# Patient Record
Sex: MALE | Race: WHITE | URBAN - METROPOLITAN AREA
[De-identification: names, ages, dates, MRNs, and addresses within clinical notes are randomized per-mention and may not be internally consistent; named-entity substitution may affect disease eponyms.]

---

## 2017-09-01 ENCOUNTER — OFFICE VISIT (OUTPATIENT)
Dept: FAMILY MEDICINE | Facility: CLINIC | Age: 37
End: 2017-09-01
Payer: COMMERCIAL

## 2017-09-01 VITALS
SYSTOLIC BLOOD PRESSURE: 127 MMHG | TEMPERATURE: 98.1 F | HEART RATE: 84 BPM | BODY MASS INDEX: 30.68 KG/M2 | OXYGEN SATURATION: 97 % | DIASTOLIC BLOOD PRESSURE: 83 MMHG | WEIGHT: 220 LBS

## 2017-09-01 DIAGNOSIS — Z23 NEED FOR PROPHYLACTIC VACCINATION WITH TETANUS-DIPHTHERIA (TD): ICD-10-CM

## 2017-09-01 DIAGNOSIS — H16.002 CORNEAL ULCER, LEFT: Primary | ICD-10-CM

## 2017-09-01 DIAGNOSIS — Z23 NEED FOR PROPHYLACTIC VACCINATION AND INOCULATION AGAINST INFLUENZA: ICD-10-CM

## 2017-09-01 PROCEDURE — 90472 IMMUNIZATION ADMIN EACH ADD: CPT | Performed by: PHYSICIAN ASSISTANT

## 2017-09-01 PROCEDURE — 90715 TDAP VACCINE 7 YRS/> IM: CPT | Performed by: PHYSICIAN ASSISTANT

## 2017-09-01 PROCEDURE — 90686 IIV4 VACC NO PRSV 0.5 ML IM: CPT | Performed by: PHYSICIAN ASSISTANT

## 2017-09-01 PROCEDURE — 90471 IMMUNIZATION ADMIN: CPT | Performed by: PHYSICIAN ASSISTANT

## 2017-09-01 PROCEDURE — 99213 OFFICE O/P EST LOW 20 MIN: CPT | Mod: 25 | Performed by: PHYSICIAN ASSISTANT

## 2017-09-01 RX ORDER — ERYTHROMYCIN 5 MG/G
1 OINTMENT OPHTHALMIC AT BEDTIME
Qty: 1 TUBE | Refills: 0 | Status: SHIPPED | OUTPATIENT
Start: 2017-09-01

## 2017-09-01 NOTE — PROGRESS NOTES
SUBJECTIVE:   Vin Salinas is a 37 year old male who presents to clinic today for the following health issues:      Eye(s) Problem  Onset: Last night    Description:   Location: right  Pain: YES  Redness: YES    Accompanying Signs & Symptoms:  Discharge/mattering: no  Swelling: YES  Visual changes: no  Fever: no  Nasal Congestion: no  Bothered by bright lights: YES    History:   Trauma: no   Foreign body exposure: no     Precipitating factors:   Wearing contacts: no     Alleviating factors:  Improved by: none    Therapies Tried and outcome: OTC med        Problem list and histories reviewed & adjusted, as indicated.  Additional history: His wife noticed he was rubbing his eyes aggressively.    Reviewed and updated as needed this visit by clinical staffTobacco  Allergies  Meds       ROS:  Constitutional, HEENT, cardiovascular, pulmonary, gi and gu systems are negative, except as otherwise noted.      OBJECTIVE:   /83 (BP Location: Left arm, Patient Position: Chair, Cuff Size: Adult Regular)  Pulse 84  Temp 98.1  F (36.7  C) (Oral)  Wt 220 lb (99.8 kg)  SpO2 97%  BMI 30.68 kg/m2  Body mass index is 30.68 kg/(m^2).  GENERAL: healthy, alert and no distress  EYES: PERRL, EOMI, eyelids- Normal and conjunctiva/corneas- sl conjunctival injection noted L eye with abrasion noted 3mm lateral edge of cornea under fluorescein evaluation.  R eye WNL   HENT: ear canals and TM's normal, nose and mouth without ulcers or lesions  NECK: no adenopathy, no asymmetry, masses, or scars and thyroid normal to palpation    Diagnostic Test Results:  none     ASSESSMENT/PLAN:   1. Corneal abrasion left  - erythromycin (ROMYCIN) ophthalmic ointment; Place 1 Application Into the left eye At Bedtime  Dispense: 1 Tube; Refill: 0    2. Need for prophylactic vaccination and inoculation against influenza  - Vaccine Administration, Initial [57793]  - FLU VAC, SPLIT VIRUS IM > 3 YO (QUADRIVALENT) [64234]  - Vaccine Administration,  Initial [98345]    3. Need for prophylactic vaccination with tetanus-diphtheria (TD)  - TDAP VACCINE (ADACEL)    Use medication as directed.  Follow up if symptoms should persist, change or worsen.  Patient amenable to this follow up plan.     Brandi Melchor PA-C  [facility]

## 2017-09-01 NOTE — NURSING NOTE
"Chief Complaint   Patient presents with     Eye Problem     started last night on the right eye. Possible for pink eye       Initial /83 (BP Location: Left arm, Patient Position: Chair, Cuff Size: Adult Regular)  Pulse 84  Temp 98.1  F (36.7  C) (Oral)  Wt 220 lb (99.8 kg)  SpO2 97%  BMI 30.68 kg/m2 Estimated body mass index is 30.68 kg/(m^2) as calculated from the following:    Height as of 9/21/16: 5' 11\" (1.803 m).    Weight as of this encounter: 220 lb (99.8 kg).  Medication Reconciliation: complete   Patti Jones MA        "

## 2017-09-01 NOTE — PATIENT INSTRUCTIONS
This summary includes the important diagnoses, test, medications and other important parts of your medical history.  Below are a few good we sites you can use to learn more about these.     Www.[employer].org : Up to date and easily searchable information on multiple topics.  Www.[employer].org/Pharmacy/c_539084.asp : [facility] $4.99 medications  Www.medlineplus.gov : medication info, interactive tutorials, watch real surgeries online  Www.familydoctor.org : good info from the Academy of Family Physicians  Www.Burst Medi[employer]ic.com : good info from the [facility]  Www.cdc.gov : public health info, travel advisories, epidemics (H1N1)  Www.aap.org : children's health info, normal development, vaccinations  Www.health.[facility].[place].us : [place] dept of heat, public health issues in [place], N1N1    Based on your medical history and these are the current health maintenance or preventive care services that you are due for (some may have been done at this visit:)  Health Maintenance Due   Topic Date Due     TETANUS IMMUNIZATION (SYSTEM ASSIGNED)  03/18/2000     INFLUENZA VACCINE (SYSTEM ASSIGNED)  09/01/2017     =================================================================================  Normal Values   Blood pressure  <140/90 for most adults    <130/80 for some chronic diseases (ask your care team about yours)    BMI (body mass index)  18.5-25 kg/m2 (based on height and weight)     Thank you for visiting [facility]    Normal or non-critical lab and imaging results will be communicated to you by MyChart, letter or phone within 7 days.  If you do not hear from us within 10 days, please call the clinic. If you have a critical or abnormal lab result, we will notify you by phone as soon as possible.     If you have any questions regarding your visit please contact:     Team Comfort:   Clinic Hours Telephone Number   Dr. Derek Dupree   7am-5pm   Monday - Friday [phone]     Pharmacy 8:30am-9pm Monday-Friday    9am-5pm Saturday-Sunday [phone]   Urgent Care 11am-9pm Monday-Friday        9am-5pm Saturday-Sunday [phone]     After hours, weekend or if you need to make an appointment with your primary provider please call [phone].   After Hours nurse advise: call [facility]: [phone]    Medication Refills:  Call your pharmacy and they will forward the refill to us. Please allow 3 business days for your refills to be completed.    Use [employer] (secure email communication and access to your chart) to send your primary care provider a message or make an appointment. Ask someone on your Team how to sign up for [employer] To log on to [employer] or for more information in [facility] please visit the website at [email or web address]  As of October 8, 2013, all password changes, disabled accounts, or ID changes in [employer]MyHealth will be done by our Access Services Department.   If you need help with your account or password, call: [phone]. Clinic staff no longer has the ability to change passwords.

## 2017-09-01 NOTE — MR AVS SNAPSHOT
After Visit Summary   9/1/2017    Vin Salinas    MRN: [id number]           Patient Information     Date Of Birth          1980        Visit Information        Provider Department      9/1/2017 4:40 PM Brandi Melchor PA-C [facility]        Today's Diagnoses     Corneal abrasion left    -  1    Need for prophylactic vaccination and inoculation against influenza        Need for prophylactic vaccination with tetanus-diphtheria (TD)          Care Instructions    This summary includes the important diagnoses, test, medications and other important parts of your medical history.  Below are a few good we sites you can use to learn more about these.     Www.Guides.co.SnapLayout : Up to date and easily searchable information on multiple topics.  Www.Guides.co.SnapLayout/Pharmacy/c_539084.asp : [facility] $4.99 medications  Www.[employer].gov : medication info, interactive tutorials, watch real surgeries online  Www.familydoctor.org : good info from the Academy of Family Physicians  Www.WebNotes.Zurrba : good info from the [facility]  Www.cdc.gov : public health info, travel advisories, epidemics (H1N1)  Www.aap.org : children's health info, normal development, vaccinations  Www.health.[facility].[place].us : [place] dept of heatlh, public health issues in [place], N1N1    Based on your medical history and these are the current health maintenance or preventive care services that you are due for (some may have been done at this visit:)  Health Maintenance Due   Topic Date Due     TETANUS IMMUNIZATION (SYSTEM ASSIGNED)  03/18/2000     INFLUENZA VACCINE (SYSTEM ASSIGNED)  09/01/2017     =================================================================================  Normal Values   Blood pressure  <140/90 for most adults    <130/80 for some chronic diseases (ask your care team about yours)    BMI (body mass index)  18.5-25 kg/m2 (based on height and weight)     Thank you for visiting [facility]    Normal or non-critical lab and imaging results will be communicated to you by MyChart, letter or phone within 7 days.  If you do not hear from us within 10 days, please call the clinic. If you have a critical or abnormal lab result, we will notify you by phone as soon as possible.     If you have any questions regarding your visit please contact:     Team Comfort:   Clinic Hours Telephone Number   Dr. Derek Dupree   7am-5pm  Monday - Friday [phone]     Pharmacy 8:30am-9pm Monday-Friday    9am-5pm Saturday-Sunday [phone]   Urgent Care 11am-9pm Monday-Friday        9am-5pm Saturday-Sunday [phone]     After hours, weekend or if you need to make an appointment with your primary provider please call [phone].   After Hours nurse advise: call [facility]: [phone]    Medication Refills:  Call your pharmacy and they will forward the refill to us. Please allow 3 business days for your refills to be completed.    Use [employer] (secure email communication and access to your chart) to send your primary care provider a message or make an appointment. Ask someone on your Team how to sign up for [employer]. To log on to [employer] or for more information in [employer] please visit the website at [email or web address].  As of October 8, 2013, all password changes, disabled accounts, or ID changes in [employer]/MyHealth will be done by our Access Services Department.   If you need help with your account or password, call: [phone]. Clinic staff no longer has the ability to change passwords.               Follow-ups after your visit        Who to contact     If you have questions or need follow up information about today's clinic visit or your schedule please contact [facility] directly at [phone].  Normal or non-critical lab and imaging results will be communicated to you by MyChart,  "letter or phone within 4 business days after the clinic has received the results. If you do not hear from us within 7 days, please contact the clinic through [employer] or phone. If you have a critical or abnormal lab result, we will notify you by phone as soon as possible.  Submit refill requests through [employer] or call your pharmacy and they will forward the refill request to us. Please allow 3 business days for your refill to be completed.          Additional Information About Your Visit        [employer] Information     [employer] lets you send messages to your doctor, view your test results, renew your prescriptions, schedule appointments and more. To sign up, go to [email or web address] . Click on \"Log in\" on the left side of the screen, which will take you to the Welcome page. Then click on \"Sign up Now\" on the right side of the page.     You will be asked to enter the access code listed below, as well as some personal information. Please follow the directions to create your username and password.     Your access code is: [email or web address]  Expires: 2017  8:20 AM     Your access code will  in 90 days. If you need help or a new code, please call your [facility] clinic or [phone].        Care EveryWhere ID     This is your Care EveryWhere ID. This could be used by other organizations to access your [facility] medical records  [id number]        Your Vitals Were     Pulse Temperature Pulse Oximetry BMI (Body Mass Index)          84 98.1  F (36.7  C) (Oral) 97% 30.68 kg/m2         Blood Pressure from Last 3 Encounters:   17 127/83   16 121/81   06/15/16 114/73    Weight from Last 3 Encounters:   17 220 lb (99.8 kg)   16 195 lb 6.4 oz (88.6 kg)   08/10/16 195 lb (88.5 kg)              We Performed the Following     FLU VAC, SPLIT VIRUS IM > 3 YO (QUADRIVALENT) [92233]     TDAP VACCINE (ADACEL)     Vaccine Administration, Initial [70124]     Vaccine Administration, Initial [45199]        "   Today's Medication Changes          These changes are accurate as of: 9/1/17 11:59 PM.  If you have any questions, ask your nurse or doctor.               Start taking these medicines.        Dose/Directions    erythromycin ophthalmic ointment   Commonly known as:  ROMYCIN   Used for:  Corneal ulcer, left   Started by:  Brandi Melchor PA-C        Dose:  1 Application   Place 1 Application Into the left eye At Bedtime   Quantity:  1 Tube   Refills:  0            Where to get your medicines      These medications were sent to [facility]/pharmacy #[id number] [place], [place] - [address] [address]     Phone:  [phone]     erythromycin ophthalmic ointment                Primary Care Provider    None Specified       No primary provider on file.        Equal Access to Services     [facility]: Maximo Raymond, michelle escobar, qaayse kaalmaserenity guerrier, yanet warner. So [facility] [phone].    ATENCIÓN: Si habla español, tiene a schroeder disposición servicios gratuitos de asistencia lingüística. Llame al [phone].    We comply with applicable federal civil rights laws and [place] laws. We do not discriminate on the basis of race, color, national origin, age, disability sex, sexual orientation or gender identity.            Thank you!     Thank you for choosing [facility]  for your care. Our goal is always to provide you with excellent care. Hearing back from our patients is one way we can continue to improve our services. Please take a few minutes to complete the written survey that you may receive in the mail after your visit with us. Thank you!             Your Updated Medication List - Protect others around you: Learn how to safely use, store and throw away your medicines at www.disposemymeds.org.          This list is accurate as of: 9/1/17 11:59 PM.  Always use your most recent med list.                   Brand  Name Dispense Instructions for use Diagnosis    erythromycin ophthalmic ointment    ROMYCIN    1 Tube    Place 1 Application Into the left eye At Bedtime    Corneal ulcer, left